# Patient Record
Sex: FEMALE | Race: WHITE | URBAN - METROPOLITAN AREA
[De-identification: names, ages, dates, MRNs, and addresses within clinical notes are randomized per-mention and may not be internally consistent; named-entity substitution may affect disease eponyms.]

---

## 2023-01-20 ENCOUNTER — OFFICE VISIT (OUTPATIENT)
Dept: URBAN - METROPOLITAN AREA CLINIC 30 | Facility: CLINIC | Age: 53
End: 2023-01-20
Payer: COMMERCIAL

## 2023-01-20 DIAGNOSIS — H16.142 PUNCTATE KERATITIS OF LEFT EYE: Primary | ICD-10-CM

## 2023-01-20 PROCEDURE — 99204 OFFICE O/P NEW MOD 45 MIN: CPT | Performed by: OPTOMETRIST

## 2023-01-20 RX ORDER — LOTEPREDNOL ETABONATE 3.8 MG/G
0.38 % GEL OPHTHALMIC
Qty: 1 | Refills: 0 | Status: ACTIVE
Start: 2023-01-20

## 2023-01-20 ASSESSMENT — INTRAOCULAR PRESSURE
OD: 15
OS: 14

## 2023-01-20 ASSESSMENT — KERATOMETRY
OD: 45.12
OS: 45.07

## 2023-01-20 ASSESSMENT — VISUAL ACUITY
OS: 20/20
OD: 20/20

## 2023-01-20 NOTE — IMPRESSION/PLAN
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 Plan: Referred by Dr. Jl Cabrales. CL related. Hx of marginal infiltrates. Currently on Tobradex- D/C for now as infection is mostly resolved at this point. Start Lotemax SM BID OS x 1 month. Use ATs QID OU. Consider daily disposables in the future.

## 2023-03-10 ENCOUNTER — OFFICE VISIT (OUTPATIENT)
Dept: URBAN - METROPOLITAN AREA CLINIC 30 | Facility: CLINIC | Age: 53
End: 2023-03-10
Payer: COMMERCIAL

## 2023-03-10 DIAGNOSIS — H16.142 PUNCTATE KERATITIS OF LEFT EYE: Primary | ICD-10-CM

## 2023-03-10 DIAGNOSIS — H02.88B MGD OF LT EYE, UPPER AND LOWER EYELIDS: ICD-10-CM

## 2023-03-10 DIAGNOSIS — H02.88A MGD OF RT EYE, UPPER AND LOWER EYELIDS: ICD-10-CM

## 2023-03-10 PROCEDURE — ALC20 AVENOVA LID CLEANSER 20ML: CUSTOM | Performed by: OPTOMETRIST

## 2023-03-10 PROCEDURE — 99214 OFFICE O/P EST MOD 30 MIN: CPT | Performed by: OPTOMETRIST

## 2023-03-10 PROCEDURE — BRUDE BRUDER EYE MOIST COMPRESS: CUSTOM | Performed by: OPTOMETRIST

## 2023-03-10 RX ORDER — NEOMYCIN SULFATE, POLYMYXIN B SULFATE AND DEXAMETHASONE 3.5; 10000; 1 MG/G; [USP'U]/G; MG/G
OINTMENT OPHTHALMIC
Qty: 3.5 | Refills: 1 | Status: ACTIVE
Start: 2023-03-10

## 2023-03-10 RX ORDER — LOTEPREDNOL ETABONATE 5 MG/G
0.5 % GEL OPHTHALMIC
Qty: 5 | Refills: 0 | Status: ACTIVE
Start: 2023-03-10

## 2023-03-10 RX ORDER — LOTEPREDNOL ETABONATE 3.8 MG/G
0.38 % GEL OPHTHALMIC
Qty: 1 | Refills: 0 | Status: ACTIVE
Start: 2023-03-10

## 2023-03-10 ASSESSMENT — INTRAOCULAR PRESSURE
OS: 14
OD: 14

## 2023-03-10 NOTE — IMPRESSION/PLAN
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 Plan: Referred by Dr. Garcia Umanzor. CL related. Hx of marginal infiltrates. Currently on Tobradex- D/C for now as infection is mostly resolved at this point. Finish Lotemax SM BID OS- pt reports some non-compliance. Continue ATs QID OU. Consider daily disposables in the future.

## 2023-03-10 NOTE — IMPRESSION/PLAN
Impression: MGD of lt eye, upper and lower eyelids: H02.88B. Plan: +TEL. WC's qhs OU. Start Maxitrol rakesh qhs OU to the lids. Recommend lid scrub. Discuss IPL/iLUX next visit.

## 2023-05-05 ENCOUNTER — OFFICE VISIT (OUTPATIENT)
Dept: URBAN - METROPOLITAN AREA CLINIC 30 | Facility: CLINIC | Age: 53
End: 2023-05-05
Payer: COMMERCIAL

## 2023-05-05 DIAGNOSIS — H16.142 PUNCTATE KERATITIS OF LEFT EYE: Primary | ICD-10-CM

## 2023-05-05 DIAGNOSIS — H16.223 KERATOCONJUNCTIVITIS SICCA, BILATERAL: ICD-10-CM

## 2023-05-05 DIAGNOSIS — H02.88A MGD OF RT EYE, UPPER AND LOWER EYELIDS: ICD-10-CM

## 2023-05-05 DIAGNOSIS — H02.88B MGD OF LT EYE, UPPER AND LOWER EYELIDS: ICD-10-CM

## 2023-05-05 PROCEDURE — 99214 OFFICE O/P EST MOD 30 MIN: CPT | Performed by: OPTOMETRIST

## 2023-05-05 PROCEDURE — 83861 MICROFLUID ANALY TEARS: CPT | Performed by: OPTOMETRIST

## 2023-05-05 RX ORDER — CYCLOSPORINE 0 G/ML
0.09 % SOLUTION/ DROPS OPHTHALMIC; TOPICAL
Qty: 60 | Refills: 6 | Status: ACTIVE
Start: 2023-05-05

## 2023-05-05 ASSESSMENT — INTRAOCULAR PRESSURE
OD: 14
OS: 14

## 2023-05-05 NOTE — IMPRESSION/PLAN
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 Wilmar Cutler [id number] 5/2023 Plan: Referred by Dr. Linda Saldana. CL related. Improved. Hx of marginal infiltrates. Currently on Tobradex- D/C'd for now. Consider daily disposables in the future. Consider BLL plugs. PLAN: Finish Lotemax SM BID OS. Continue ATs QID OU. Start Cequa BID OU.

## 2023-05-05 NOTE — IMPRESSION/PLAN
Impression: MGD of rt eye, upper and lower eyelids: H02.88A. Plan: See other notes for clinical correlations.

## 2023-05-05 NOTE — IMPRESSION/PLAN
Impression: MGD of lt eye, upper and lower eyelids: H02.88B. Plan: +TEL. Discuss IPL/iLUX next visit. PLAN: WC's qhs OU. D/C Maxitrol rakesh qhs OU to the lids. lid scrubs.

## 2023-08-04 ENCOUNTER — OFFICE VISIT (OUTPATIENT)
Dept: URBAN - METROPOLITAN AREA CLINIC 30 | Facility: CLINIC | Age: 53
End: 2023-08-04
Payer: COMMERCIAL

## 2023-08-04 DIAGNOSIS — H02.88B MGD OF LT EYE, UPPER AND LOWER EYELIDS: ICD-10-CM

## 2023-08-04 DIAGNOSIS — H02.88A MGD OF RT EYE, UPPER AND LOWER EYELIDS: ICD-10-CM

## 2023-08-04 DIAGNOSIS — H16.142 PUNCTATE KERATITIS OF LEFT EYE: Primary | ICD-10-CM

## 2023-08-04 PROCEDURE — 83861 MICROFLUID ANALY TEARS: CPT | Performed by: OPTOMETRIST

## 2023-08-04 RX ORDER — NEOMYCIN SULFATE, POLYMYXIN B SULFATE AND DEXAMETHASONE 3.5; 10000; 1 MG/G; [USP'U]/G; MG/G
OINTMENT OPHTHALMIC
Qty: 3.5 | Refills: 1 | Status: ACTIVE
Start: 2023-08-04

## 2023-08-04 RX ORDER — DOXYCYCLINE 50 MG/1
50 MG TABLET, FILM COATED ORAL
Qty: 60 | Refills: 1 | Status: ACTIVE
Start: 2023-08-04

## 2023-08-04 ASSESSMENT — INTRAOCULAR PRESSURE
OS: 15
OD: 15